# Patient Record
(demographics unavailable — no encounter records)

---

## 2025-02-27 NOTE — DISCUSSION/SUMMARY
[Reviewed Clinical Lab Test(s)] : Results of clinical tests were reviewed. [Discuss Tests w/Referring Providers] : Results of labs/radiology studies and the treatment recommendations were discussed with performing/referring physician. [Discuss Alternatives/Risks/Benefits w/Patient] : All alternatives, risks, and benefits were discussed with the patient/family and all questions were answered.  Patient expressed good understanding and appreciates the importance of follow up as recommended. [FreeTextEntry1] : I had a discussion with Nazanin regarding the etiology, natural history, diagnosis and management of genital HPV infection. I explained my colposcopic findings today.  Since she had only low-grade dysplasia on biopsy , no intervention is warranted; close followup is recommended given her long h/o HRHPV persistence.  - she was advised to f/u with me in 6m for repeat pap and colpo She had many questions which were answered to her apparent satisfaction.

## 2025-02-27 NOTE — PHYSICAL EXAM
[Chaperone Present] : A chaperone was present in the examining room during all aspects of the physical examination [FreeTextEntry2] : Loulou [Normal] : Anus and perineum: Normal sphincter tone, no masses, no prolapse. [de-identified] : see colpo note [de-identified] : adnexa nonpalpable

## 2025-02-27 NOTE — PHYSICAL EXAM
[Chaperone Present] : A chaperone was present in the examining room during all aspects of the physical examination [FreeTextEntry2] : Loulou [Normal] : Anus and perineum: Normal sphincter tone, no masses, no prolapse. [de-identified] : see colpo note [de-identified] : adnexa nonpalpable

## 2025-02-27 NOTE — PROCEDURE
[Colposcopy] : colposcopy [Cervical Dysplasia] : cervical dysplasia [HPV high risk] : PCR positive for high risk HPV [Patient] : the patient [Verbal Consent] : verbal consent was obtained prior to the procedure and is detailed in the patient's record [Site Verification] : site verification performed. [Time Out] : Time Out Procedure:The following was confirmed prior to the procedure-Correct patient identity, correct site, agreement on the procedure to be done and correct patient position. [SCJ Fully Visualized] : the squamocolumnar junction was fully visualized [No Abnormalities] : no abnormalities seen [Biopsies Taken: # ___] : no biopsies were taken of the cervix [No Complications] : none [Tolerated Well] : the patient tolerated the procedure well [Post procedure instructions and information given] : post procedure instructions and information given and reviewed with patient. [0] : 0

## 2025-02-27 NOTE — HISTORY OF PRESENT ILLNESS
[FreeTextEntry1] : Ms. FITZPATRICK is a 47 year old  referred by Dr. Chaidez for low grade dysplasia.   She has a hx of abnormal pap smears, however she has never required an excisional procedure.  - Pap- ASCUS, HRHPV (+) 2023- Pap- negative, HRHPV(+) 2023- Pap- negative , HRHPV (+) 2024- Pap- ASCUS, HRHPV (+) 2025- Pap- ASCUS, HRHPV (+) - slide review confirmed diagnosis    2025- Colposcopy-   ECC- benign endocervical mucosa   Cervix at 6- low grade squamous intraepithelial lesion   Cervix at 7- benign  (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  She denies any other associated signs or symptoms.   She is here today for further evaluation.     HM: Pap- see above Mammo- - BIRADS- 2 Colonoscopy-  negative per patient

## 2025-02-27 NOTE — REVIEW OF SYSTEMS
[Negative] : Musculoskeletal What Type Of Note Output Would You Prefer (Optional)?: Standard Output Hpi Title: Evaluation of Skin Lesions

## 2025-02-27 NOTE — HISTORY OF PRESENT ILLNESS
[FreeTextEntry1] : Ms. FITZPATRICK is a 47 year old  referred by Dr. Chaidez for low grade dysplasia.   She has a hx of abnormal pap smears, however she has never required an excisional procedure.  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  She denies any other associated signs or symptoms.   She is here today for further evaluation.     HM: Pap- see above Mammo- - BIRADS- 2 Colonoscopy-  negative per patient

## 2025-02-27 NOTE — PAST MEDICAL HISTORY
[Menarche Age ____] : age at menarche was [unfilled] [Definite ___ (Date)] : the last menstrual period was [unfilled] [Total Preg ___] : G[unfilled] [Living ___] : Living: [unfilled]

## 2025-02-27 NOTE — OB HISTORY
[Total Preg ___] : : [unfilled] [Living ___] : [unfilled] (living) [Vaginal ___] : [unfilled] vaginal delivery(s) [Menarche Age ____] : age at menarche was [unfilled]